# Patient Record
Sex: FEMALE | Race: WHITE | NOT HISPANIC OR LATINO
[De-identification: names, ages, dates, MRNs, and addresses within clinical notes are randomized per-mention and may not be internally consistent; named-entity substitution may affect disease eponyms.]

---

## 2018-01-17 ENCOUNTER — RECORDS - HEALTHEAST (OUTPATIENT)
Dept: ADMINISTRATIVE | Facility: OTHER | Age: 68
End: 2018-01-17

## 2018-01-18 ENCOUNTER — RECORDS - HEALTHEAST (OUTPATIENT)
Dept: ADMINISTRATIVE | Facility: OTHER | Age: 68
End: 2018-01-18

## 2018-01-30 ENCOUNTER — AMBULATORY - HEALTHEAST (OUTPATIENT)
Dept: CARDIAC REHAB | Facility: CLINIC | Age: 68
End: 2018-01-30

## 2018-01-30 DIAGNOSIS — I21.4 NON-ST ELEVATION MI (NSTEMI) (H): ICD-10-CM

## 2018-01-30 DIAGNOSIS — Z95.5 STENTED CORONARY ARTERY: ICD-10-CM

## 2018-02-09 ENCOUNTER — AMBULATORY - HEALTHEAST (OUTPATIENT)
Dept: CARDIAC REHAB | Facility: CLINIC | Age: 68
End: 2018-02-09

## 2018-02-09 DIAGNOSIS — I21.4 NON-ST ELEVATION MI (NSTEMI) (H): ICD-10-CM

## 2018-02-09 DIAGNOSIS — Z95.5 STENTED CORONARY ARTERY: ICD-10-CM

## 2018-02-09 RX ORDER — LISINOPRIL 5 MG/1
5 TABLET ORAL DAILY
Status: SHIPPED | COMMUNITY
Start: 2018-02-09

## 2018-02-09 RX ORDER — BISOPROLOL FUMARATE 10 MG/1
10 TABLET, FILM COATED ORAL DAILY
Status: SHIPPED | COMMUNITY
Start: 2018-02-09

## 2018-02-09 RX ORDER — EZETIMIBE 10 MG/1
10 TABLET ORAL DAILY
Status: SHIPPED | COMMUNITY
Start: 2018-02-09

## 2018-02-09 RX ORDER — ACETAMINOPHEN 500 MG
500 TABLET ORAL EVERY 6 HOURS PRN
Status: SHIPPED | COMMUNITY
Start: 2018-02-09

## 2018-02-09 RX ORDER — CLOPIDOGREL BISULFATE 75 MG/1
75 TABLET ORAL DAILY
Status: SHIPPED | COMMUNITY
Start: 2018-02-09

## 2018-02-09 RX ORDER — ASPIRIN 81 MG/1
81 TABLET, CHEWABLE ORAL DAILY
Status: SHIPPED | COMMUNITY
Start: 2018-02-09

## 2018-02-09 RX ORDER — ROSUVASTATIN CALCIUM 40 MG/1
40 TABLET, COATED ORAL AT BEDTIME
Status: SHIPPED | COMMUNITY
Start: 2018-02-09

## 2018-02-09 RX ORDER — CHLORTHALIDONE 25 MG/1
25 TABLET ORAL DAILY
Status: SHIPPED | COMMUNITY
Start: 2018-02-09

## 2018-02-22 ENCOUNTER — AMBULATORY - HEALTHEAST (OUTPATIENT)
Dept: CARDIAC REHAB | Facility: CLINIC | Age: 68
End: 2018-02-22

## 2018-02-22 DIAGNOSIS — Z95.5 STENTED CORONARY ARTERY: ICD-10-CM

## 2018-02-22 DIAGNOSIS — I21.4 NON-ST ELEVATION MI (NSTEMI) (H): ICD-10-CM

## 2018-03-01 ENCOUNTER — AMBULATORY - HEALTHEAST (OUTPATIENT)
Dept: CARDIAC REHAB | Facility: CLINIC | Age: 68
End: 2018-03-01

## 2018-03-01 DIAGNOSIS — Z95.5 STENTED CORONARY ARTERY: ICD-10-CM

## 2018-03-01 DIAGNOSIS — I21.4 NON-ST ELEVATION MI (NSTEMI) (H): ICD-10-CM

## 2018-03-08 ENCOUNTER — AMBULATORY - HEALTHEAST (OUTPATIENT)
Dept: CARDIAC REHAB | Facility: CLINIC | Age: 68
End: 2018-03-08

## 2018-03-08 DIAGNOSIS — Z95.5 STENTED CORONARY ARTERY: ICD-10-CM

## 2018-03-08 DIAGNOSIS — I21.4 NON-ST ELEVATION MI (NSTEMI) (H): ICD-10-CM

## 2018-03-15 ENCOUNTER — AMBULATORY - HEALTHEAST (OUTPATIENT)
Dept: CARDIAC REHAB | Facility: CLINIC | Age: 68
End: 2018-03-15

## 2018-03-15 DIAGNOSIS — I21.4 NON-ST ELEVATION MI (NSTEMI) (H): ICD-10-CM

## 2018-03-15 DIAGNOSIS — Z95.5 STENTED CORONARY ARTERY: ICD-10-CM

## 2018-03-22 ENCOUNTER — AMBULATORY - HEALTHEAST (OUTPATIENT)
Dept: CARDIAC REHAB | Facility: CLINIC | Age: 68
End: 2018-03-22

## 2018-03-22 DIAGNOSIS — I21.4 NON-ST ELEVATION MI (NSTEMI) (H): ICD-10-CM

## 2018-03-22 DIAGNOSIS — Z95.5 STENTED CORONARY ARTERY: ICD-10-CM

## 2018-03-29 ENCOUNTER — AMBULATORY - HEALTHEAST (OUTPATIENT)
Dept: CARDIAC REHAB | Facility: CLINIC | Age: 68
End: 2018-03-29

## 2018-03-29 DIAGNOSIS — I21.4 NON-ST ELEVATION MI (NSTEMI) (H): ICD-10-CM

## 2018-03-29 DIAGNOSIS — Z95.5 STENTED CORONARY ARTERY: ICD-10-CM

## 2018-04-05 ENCOUNTER — AMBULATORY - HEALTHEAST (OUTPATIENT)
Dept: CARDIAC REHAB | Facility: CLINIC | Age: 68
End: 2018-04-05

## 2018-04-05 DIAGNOSIS — Z95.5 STENTED CORONARY ARTERY: ICD-10-CM

## 2018-04-05 DIAGNOSIS — I21.4 NON-ST ELEVATION MI (NSTEMI) (H): ICD-10-CM

## 2018-04-12 ENCOUNTER — AMBULATORY - HEALTHEAST (OUTPATIENT)
Dept: CARDIAC REHAB | Facility: CLINIC | Age: 68
End: 2018-04-12

## 2018-04-12 DIAGNOSIS — I21.4 NON-ST ELEVATION MI (NSTEMI) (H): ICD-10-CM

## 2018-04-12 DIAGNOSIS — Z95.5 STENTED CORONARY ARTERY: ICD-10-CM

## 2018-05-10 ENCOUNTER — AMBULATORY - HEALTHEAST (OUTPATIENT)
Dept: CARDIAC REHAB | Facility: CLINIC | Age: 68
End: 2018-05-10

## 2018-05-10 DIAGNOSIS — Z95.5 STENTED CORONARY ARTERY: ICD-10-CM

## 2018-05-10 DIAGNOSIS — I21.4 NON-ST ELEVATION MI (NSTEMI) (H): ICD-10-CM

## 2018-05-17 ENCOUNTER — AMBULATORY - HEALTHEAST (OUTPATIENT)
Dept: CARDIAC REHAB | Facility: CLINIC | Age: 68
End: 2018-05-17

## 2018-05-17 DIAGNOSIS — Z95.5 STENTED CORONARY ARTERY: ICD-10-CM

## 2018-05-17 DIAGNOSIS — I21.4 NON-ST ELEVATION MI (NSTEMI) (H): ICD-10-CM

## 2018-05-24 ENCOUNTER — AMBULATORY - HEALTHEAST (OUTPATIENT)
Dept: CARDIAC REHAB | Facility: CLINIC | Age: 68
End: 2018-05-24

## 2018-05-24 DIAGNOSIS — Z95.5 STENTED CORONARY ARTERY: ICD-10-CM

## 2018-05-24 DIAGNOSIS — I21.4 NON-ST ELEVATION MI (NSTEMI) (H): ICD-10-CM

## 2021-06-01 VITALS — WEIGHT: 193.4 LBS

## 2021-06-01 VITALS — WEIGHT: 190.6 LBS

## 2021-06-01 VITALS — WEIGHT: 200 LBS

## 2021-06-01 VITALS — WEIGHT: 186 LBS

## 2021-06-01 VITALS — WEIGHT: 194.2 LBS

## 2021-06-01 VITALS — WEIGHT: 192 LBS

## 2021-06-01 VITALS — WEIGHT: 190.8 LBS

## 2021-06-01 VITALS — WEIGHT: 191.8 LBS

## 2021-06-01 VITALS — WEIGHT: 183.4 LBS

## 2021-06-01 VITALS — WEIGHT: 186.8 LBS

## 2021-06-01 VITALS — WEIGHT: 196 LBS

## 2021-06-15 NOTE — PROGRESS NOTES
Cardiac Rehab  Phase II Assessment    Assessment Date: 2/9/2018    Diagnosis/Procedure:: NSTEMI, PCI with NAREN to LAD, dRCA  Date of Onset: 1/17/18, 1/18/18    ICD/Pacemaker: No Parameters: N/A  Post-op Complications: None  ECG History: SR/SB EF%:55  Past Medical History:     HTN    Dyslipidemia    Family Hx of CAD    Stress    Overweight    Pre Diabetes    Malignent Melonoma    Migraines    appy    Tubal ligation     Physical Assessment  Precautions/ Physical Limitations: standard CAD  Oxygen: No  O2 Sats: 100 Lung Sounds: clear Edema: none  Incisions: healed well  Sleeping Pattern: good   Appetite: good   Nutrition Risk Screen: Weight loss: pt does not know ideal weight and would like more information from dietician    Pain  Location: -  Characteristics:-  Intensity: (0-10 scale) 0  Current Pain Management: -  Intervention: -  Response: -    Psychosocial/ Emotional Health  1. In the past 12 months, have you been in a relationship where you have been abused physically, emotionally, sexually or financially? No  notified: NA  2. Who do you turn to for emotional support?: , family, friends  3. Do you have cultural or spiritual needs? No  4. Have there been any major life changes in the past 12 months? No    Referral Information  Primary Physician: Marixa Rankin MD  Cardiologist: Wm. Hoffman [facility]  Surgeon: -    Home exercise/Equipment: none    Patient's long-term goal(s): wt loss, heart healthy lifestyle    1. Living Accommodations: Home Steps: Yes      Support people at home:    2. Marital Status:   3. Family is able to assist with cares      Caodaism/Community involvement: yes  4. Recreation/Hobbies: reading, movies and baking and music

## 2021-06-15 NOTE — PROGRESS NOTES
ITP ASSESSMENT   Assessment Day: Initial  SOC:2/9/2018  Session Number: 1  Precautions: CAD  Diagnosis: MI;Stent (1/17/18 NSTEMI, 1/18/18 PCI with NAREN  to LAD, dRCA)  Risk Stratification: High (Due to high LDL)  Referring Provider: Wm Hoffman-Cardiologist at [facility] *ITP's  EXERCISE  Exercise Assessment: Initial     6 Minute Walk Test   Pre   Pre Exercise HR: 50 (100%)                  Pre Exercise BP: 112/64    Peak  Peak HR: 96                 Peak BP: 128/58  Peak feet: 1550  Peak O2 SAT: 98  Peak RPE: 11  Peak MPH: 2.94    Symptoms:  Peak Symptoms: none    5 mins. Post  5 Min Post HR: 53 (100%)  5 Min Post BP: 110/60                         Exercise Plan  Goals Next 30 days  ADL'S: Independent with all ADL's  Leisure: Start a regular exercise program. 15'x2/day 4-6x/week including CR attendence of 1x/week  Work: RTW without limitations or restrictions.     Education Goals: Patient can state cardiac s/s and appropriate emergency response.  Education Goals Met: Has system for taking medication.;Medication review.    Exercise Prescription  Exercise Mode: Bike;Treadmill;Nustep;Arm Erg.;Stairs;Hallway Walking  Frequency: 1x/week  Duration: 30-45minutes  Intensity / THR: 20-30 beats above resting heart rate  RPE 11-14  Progression / Met level: 3.2-5+  Resistive Training?: Yes    Current Exercise (mins/week): 1    Interventions  Home Exercise:  Mode: Walking  Frequency: 4-6x/week  Duration: 15' x2 goal is 30minutes per day but starting in short increments    Education Material : Educational videos;Provide written material;Individual education and counseling;Offer educational classes    Education Completed  Exercise Education Completed: Cardiac Anatomy;Signs and Symptoms;Medication review;RPE;Emergency Plan;Home Exercise;Warm up/cool down;FITT Principles;BP/HR Reponse to exercise;Stretching;Strength training;Benefits of Exercise;End point of exercise            Exercise Follow-up/Discharge  Follow up/Discharge: Pt is  "only able to attend CR 1x/week due to full time work. Pt plans to exercise on non rehab days. Continue to encourage HEP on non rehab days and progress as lisa each session in CR.  NUTRITION  Nutrition Assessment: Initial    Nutrition Risk Factors:  Nutrition Risk Factors: Dyslipidemia;Overweight  Cholesterol: 290 (1/18/18)  LDL: 205  HDL: 49  Triglycerides: 182    Nutrition Plan  Interventions  Diet Consult: NA (will schedule in future when pt interested)  Other Nutrition Intervention: Diet Class;Therapist/Pt Discussion;Provide with Written Material  Initial Rate Your Plate Score: 0 (RYP survey given today and encourage to complete by next x)    Education Completed  Nutrition Education Completed: Low Saturated fat diet;Risk factor overview;Low sodium diet    Goals  Nutrition Goals (Next 30 days): Patient will lose weight;Patient knows appropriate portion size;Patient can identify their risk factors for CAD    Goals Met  Nutrition Goals Met: Provided Rate your Plate Survey;Completed Nutritional Risk Screen;Patient states following a low saturated fat diet;Patient follows a low sodium diet    Height, Weight, and  BMI  Weight: 200 lb (90.7 kg)  Height: 5' 9\" (1.753 m)  BMI: 29.52    Nutrition Follow-up  Follow-up/Discharge: Pt only attends 1x/week so we will try and see if a dietician is available for 1:1 consult on Thursdays. Pt is already following a heart healthy diet.        Other Risk Factors  Other Risk Factor Assessment: Initial    HTN Risk Factor: Hypertension    Pre Exercise BP: 112/64  Post Exercise BP: 110/60    Hypertension Plan  Goals  HTN Goals: Exercises regularly    Goals Met  HTN Goals Met: Follow low sodium diet;Take medication as prescribed    HTN Interventions  HTN Interventions: Diet consult;Therapist/patient discussion;Provide written material;Offer educational classes    HTN Education Completed  HTN Education Completed: Low sodium diet;Medication review;Risk factor overview    Tobacco Risk Factor: " NA    Risk Factor Follow-up   Follow-up/Discharge: Family Hx is huge CRF.    PSYCHOSOCIAL  Psychosocial Assessment: Initial     [facility] L Summary Score: 20    PARISH-D Score: 4    Psychosocial Risk Factor: Stress    Psychosocial Plan  Interventions  If PARISH-D > 15 send letter to MD  Interventions: Offer educational videos and classes;Provide written material;Individual education and counseling    Education Completed  Education Completed: Relaxation/Coping Techniques;S/S of depression;Effects of stress on body    Goals  Goals (Next 30 days): [facility];Patient demonstrates understanding of stress, no goals identified for the next 30 days    Goals Met  Goals Met: Identified Support system;Oriented to stress management classes;Identify stressors;Practicing stress management skills    Psychosocial Follow-up  Follow-up/Discharge: Pt has a great support system with family, friends and co-workers. She feels she is managing her stressors pretty well through: support system, reading, movies, meditation, deep breathing, prayer, shan and massage. He stressors that are hard to mange is her boss/upper management due to different personality. She plans to consult with an HR friend and occupational RN for guidedance with dealing with different management styles. Otherwise pt reports loving her job, her team which is mostly always postitive with some challenges.    Patient involved in Goal setting?: Yes    Signature: _____________________________________________________________    Date: __________________Time: __________________

## 2021-06-16 NOTE — PROGRESS NOTES
ITP ASSESSMENT   Assessment Day: 30 Day  Session Number: 4  Precautions: CAD  Diagnosis: MI;Stent (1/17/18 NSTEMI, 1/18/18 PCI with NAREN  to LAD, dRCA)  Risk Stratification: High  Referring Provider: Jesus Hoffman MD  EXERCISE  Exercise Assessment: Reassessment                          Exercise Plan  Goals Next 30 days  ADL'S: Independent with all home activities-goal met  Leisure: Tolerate climbing 4 flights of stairs in the parking ramp  Work: Pt has returned to work with no difficulties-goal met    Education Goals: All goals in this section met  Education Goals Met: Patient can state cardiac s/s and appropriate emergency response.;Has system for taking medication.;Medication review.                        Goals Met  Initial ADL's goals met: Independent with all ADLS  Initial Leisure goals met: Pt is walking 4x/week on non-rehab days for 30 days  Intial Work goals met: Pt has RTW  Initial Progression: Pt is consistent with home exercise, has reached 3.1MET level on treadmill.    Exercise Prescription  Exercise Mode: Treadmill;Bike;Nustep;Arm Erg.;Stairs  Frequency: 1x/week  Duration: 40-45 min  Intensity / THR: 20-30 beats above resting heart rate  RPE 11-14  Progression / Met level: 3.2-5  Resistive Training?: Yes    Current Exercise (mins/week): 160    Interventions  Home Exercise:  Mode: walking with stairs  Frequency: 4x/week  Duration: 30-40 min    Education Material : Educational videos;Provide written material;Individual education and counseling;Offer educational classes    Education Completed  Exercise Education Completed: Cardiac Anatomy;Signs and Symptoms;Medication review;RPE;Emergency Plan;Home Exercise;Warm up/cool down;FITT Principles;BP/HR Reponse to exercise;Stretching;Strength training;Benefits of Exercise;End point of exercise            Exercise Follow-up/Discharge  Follow up/Discharge: Pt has started exercising consistently 4 days per week on non-rehab days         NUTRITION  Nutrition  "Assessment: Reassessment    Nutrition Risk Factors:  Nutrition Risk Factors: Dyslipidemia;Overweight    Nutrition Plan  Interventions  Diet Consult: Declined  Other Nutrition Intervention: Diet Class;Therapist/Pt Discussion;Provide with Written Material  Initial Rate Your Plate Score: 0 (RYP survey given today and encourage to complete by next session)      Education Completed  Nutrition Education Completed: Low Saturated fat diet;Risk factor overview;Low sodium diet    Goals  Nutrition Goals (Next 30 days): Patient will lose weight (pt wants to continue losing wt)    Goals Met  Nutrition Goals Met: Provided Rate your Plate Survey;Completed Nutritional Risk Screen;Patient states following a low saturated fat diet;Patient follows a low sodium diet;Patient has lost weight;Patient can identify their risk factors for CAD;Patient knows appropriate portion size    Height, Weight, and  BMI  Weight: 193 lb 6.4 oz (87.7 kg)  Height: 5' 9\" (1.753 m)  BMI: 28.55    Nutrition Follow-up  Follow-up/Discharge: Pt is pleased with her wt loss so far.     Other Risk Factors  Other Risk Factor Assessment: Reassessment    HTN Risk Factor: Hypertension    Pre Exercise BP: 118/64  Post Exercise BP: 116/56    Hypertension Plan  Goals  HTN Goals: Patient demonstrates understanding of HTN, no goals identified for the next 30 days    Goals Met  HTN Goals Met: Follow low sodium diet;Take medication as prescribed;Exercises regularly    HTN Interventions  HTN Interventions: Therapist/patient discussion;Provide written material;Offer educational classes    HTN Education Completed  HTN Education Completed: Low sodium diet;Medication review;Risk factor overview    Tobacco Risk Factor: NA    Risk Factor Follow-up   Follow-up/Discharge: Family Hx is huge CRF.        PSYCHOSOCIAL  Psychosocial Assessment: Reassessment     Psychosocial Risk Factor: Stress    Psychosocial Plan  Interventions  Interventions: Offer educational videos and classes;Provide " written material;Individual education and counseling    Education Completed  Education Completed: Relaxation/Coping Techniques;S/S of depression;Effects of stress on body    Goals  Goals (Next 30 days): Improvement in [employer] COOP score;Patient demonstrates understanding of stress, no goals identified for the next 30 days    Goals Met  Goals Met: Identified Support system;Oriented to stress management classes;Identify stressors;Practicing stress management skills    Psychosocial Follow-up  Follow-up/Discharge: Pt has a great support system with family, friends and co-workers. She feels she is managing her stressors pretty well through: support system, baking, reading, movies, meditation, deep breathing, prayer, shan and massage. He stressors that are hard to mange is her boss/upper management due to different personality. She plans to consult with an HR friend and occupational RN for guidedance with dealing with different management styles. Otherwise pt reports loving her job, her team which is mostly always postitive with some challenges.          Patient involved in Goal setting?: Yes    Signature: _____________________________________________________________    Date: __________________    Time: __________________

## 2021-06-17 NOTE — PROGRESS NOTES
Tracy WYATT Sterlingmichel has participated in 10 sessions of Phase II Cardiac Rehab.    Progress Report:   Cardiac Rehab Treatment Progress Report 3/22/2018 3/29/2018 4/5/2018 4/12/2018 5/10/2018   Weight 191 lbs 13 oz 190 lbs 13 oz 190 lbs 10 oz 190 lbs 10 oz 186 lbs 13 oz   Pre Exercise  HR 52 60 60 59 52   Pre Exercise /60 102/62 122/62 126/66 96/54   Treadmill Peak HR 85 86 - 86 91   Treadmill Peak Blood Pressure 118/60 - - 136/62 122/62   Nustep Peak Heart Rate - - 76 - 70   Heart Rate 59 60 54 59 55   Post Exercise /60 106/60 98/52 102/60 104/50   ECG SB/SR w/rare PVC's  SB/SR w/rare PVC's  SB/SR SB/SR SB/SR, isolated PVC   Total Exercise Minutes 49 47 46 50 43         Current Status:  Currently exercising without complaints or symptoms.  Pt reports she is tolerating all normal home and work activities.  Pt plans to attend 2 more sessions.    If Physician recommends change in treatment plan, please place orders.        __________________________________________________      _____________  Signature                                                                                                  Date

## 2021-06-17 NOTE — PROGRESS NOTES
ITP ASSESSMENT   Assessment Day: 60 Day   SOC:2/9/18  Session Number: 8  Precautions: Standard CAD Precautions  Diagnosis: MI;Stent (1/17/18 NSTEMI, 1/18/18 PCI with NAREN  to LAD, dRCA)  Risk Stratification: High  Referring Provider: Wm Dalton   EXERCISE  Exercise Assessment: Reassessment                       Exercise Plan  Goals Next 30 days  ADL'S: Independent-Goal met  Leisure: Resume Strength training program 2x/weekContinue to improve endurance with stair climbing from 2 flights to 4 flights of stairs  Work: RTW without limitations    Education Goals: All goals in this section met  Education Goals Met: Patient can state cardiac s/s and appropriate emergency response.;Has system for taking medication.;Medication review.                        Goals Met  30 day ADL'S goals met: Independent with all ADL's and home activities. Goal is met  30 day Leisure goals met: Pt is able to walk 2 flights of stairs and reports that she continues to get tired. Her goal is to be able ot due 4 flights of stairs.  30 day Work goals met: RTW without difficulty.  30 Day Progression: Pt has progressed to 2.7-2.9 Vision bike, 2.4 Nustep, 3.6 TM and 5 Stairs. Continue to progress to LTG of 5-6METs. Pt is also interested in strength training and will start 2x/week. 1x in CR and 1x at [facility]    Initial ADL's goals met: Independent with all ADLS  Initial Leisure goals met: Pt is walking 4x/week on non-rehab days for 30 days  Intial Work goals met: Pt has RTW  Initial Progression: Pt is consistent with home exercise, has reached 3.1MET level on treadmill.    Exercise Prescription  Exercise Mode: Treadmill;Bike;Nustep;Arm Erg.;Stairs;Hallway Walking  Frequency: 1x/week  Duration: 40-50minutes  Intensity / THR: 20-30 beats above resting heart rate  RPE 11-14  Progression / Met level: 3.6-6  Resistive Training?: Yes    Current Exercise (mins/week): 150    Interventions  Home Exercise:  Mode: Walking  Frequency: 3-5x/week  Duration:  "30minutes    Education Material : Educational videos;Provide written material;Individual education and counseling;Offer educational classes    Education Completed  Exercise Education Completed: Cardiac Anatomy;Signs and Symptoms;Medication review;RPE;Emergency Plan;Home Exercise;Warm up/cool down;FITT Principles;BP/HR Reponse to exercise;Stretching;Strength training;Benefits of Exercise;End point of exercise            Exercise Follow-up/Discharge  Follow up/Discharge: Pt would like to continue to progress to LTG of 5-6METs and complete 12 sessions. She has a [employer] membership and will be walking outside for [employer]. NUTRITION  Nutrition Assessment: Reassessment  Nutrition Risk Factors:  Nutrition Risk Factors: Dyslipidemia;Overweight  Nutrition Plan  Interventions  Diet Consult: Declined  Other Nutrition Intervention: Diet Class;Therapist/Pt Discussion;Provide with Written Material  Initial Rate Your Plate Score: 0 (RYP survey given today and encourage to complete by next x)  Follow-Up Rate Your Plate Score: 61    Education Completed  Nutrition Education Completed: Low Saturated fat diet;Risk factor overview;Low sodium diet    Goals  Nutrition Goals (Next 30 days): Patient will lose weight (Continue to lose weight-pt goal)    Goals Met  Nutrition Goals Met: Provided Rate your Plate Survey;Completed Nutritional Risk Screen;Patient states following a low saturated fat diet;Patient follows a low sodium diet;Patient has lost weight;Patient can identify their risk factors for CAD;Patient knows appropriate portion size;Reviewed Dietitian schedule    Height, Weight, and  BMI  Weight: 190 lb 9.6 oz (86.5 kg)  Height: 5' 9\" (1.753 m)  BMI: 28.13    Nutrition Follow-up  Follow-up/Discharge: Pt continues to have great weight loss.       Other Risk Factors  Other Risk Factor Assessment: Reassessment    HTN Risk Factor: Hypertension    Pre Exercise BP: 122/62  Post Exercise BP: 98/52    Hypertension Plan  Goals  HTN Goals: Patient " demonstrates understanding of HTN, no goals identified for the next 30 days    Goals Met  HTN Goals Met: Follow low sodium diet;Take medication as prescribed;Exercises regularly    HTN Interventions  HTN Interventions: Therapist/patient discussion;Provide written material;Offer educational classes;Diet consult    HTN Education Completed  HTN Education Completed: Low sodium diet;Medication review;Risk factor overview    Tobacco Risk Factor: NA    Risk Factor Follow-up   Follow-up/Discharge: CRF: Pt is follow low fat, low salt and watching calories. Exercising regularly and taking medications asa prescribed PSYCHOSOCIAL  Psychosocial Assessment: Reassessment     Psychosocial Risk Factor: Stress    Psychosocial Plan  Interventions  Interventions: Offer educational videos and classes;Provide written material;Individual education and counseling    Education Completed  Education Completed: Relaxation/Coping Techniques;S/S of depression;Effects of stress on body    Goals  Goals (Next 30 days): Improvement in [employer] COOP score;Patient demonstrates understanding of stress, no goals identified for the next 30 days    Goals Met  Goals Met: Identified Support system;Oriented to stress management classes;Identify stressors;Practicing stress management skills    Psychosocial Follow-up  Follow-up/Discharge: Pt is managing stressors well. Reading and walking. Pt relies on her 10' walks at work to help relieve stress.    Patient involved in Goal setting?: Yes    Signature: _____________________________________________________________    Date: __________________Time: __________________

## 2021-06-17 NOTE — PROGRESS NOTES
"ITP ASSESSMENT   Assessment Day: 90 Day  Session Number: 10  Diagnosis: MI;Stent (1/17/18 NSTEMI, 1/18/18 PCI with NAREN  to LAD, dRCA)  Risk Stratification: High  Referring Provider: Jesus Hoffman MD  EXERCISE  Exercise Assessment: Reassessment                          Exercise Plan  Goals Next 30 days  ADL'S: Independent-Goal Met  Leisure:  Tolerate climbing 4 flights of stairs more comfortably  Work: Has RTW-goal met    Education Goals: All goals in this section met  Education Goals Met: Patient can state cardiac s/s and appropriate emergency response.;Has system for taking medication.;Medication review.                        Goals Met  60 day ADL'S goals met: Goal met  60 day Leisure goals met: Pt is doing strength training 2x/week, has done 4 flights of stair 3x, \"a little difficult, but I did it\"  60 day Work goals met: Has RTW without difficulty  60 Day Progression: Pt tolerated 5.2MET level on  stairs.    30 day ADL'S goals met: Independent with all ADL's and home activities. Goal is met  30 day Leisure goals met: Pt is able to walk 2 flights of stairs and reports that she continues to get tired. Her goal is to be able ot due 4 flights of stairs.  30 day Work goals met: RTW without difficulty.  30 Day Progression: Pt has progressed to 2.7-2.9 Vision bike, 2.4 Nustep, 3.6 TM and 5 Stairs. Continue to progress to LTG of 5-6METs. Pt is also interested in strength training and will start 2x/week. 1x in [place] and 1x at [facility]    Initial ADL's goals met: Independent with all ADLS  Initial Leisure goals met: Pt is walking 4x/week on non-rehab days for 30 days  Intial Work goals met: Pt has RTW  Initial Progression: Pt is consistent with home exercise, has reached 3.1MET level on treadmill.    Exercise Prescription  Exercise Mode: Treadmill;Bike;Nustep;Arm Erg.;Stairs;Hallway Walking  Frequency: 1x/week  Duration: 40-50 minutes  Intensity / THR: 20-30 beats above resting heart rate  RPE 11-14  Progression / Met level: " "3.6-6  Resistive Training?: Yes    Current Exercise (mins/week): 200    Interventions  Home Exercise:  Mode: walking, nustep, wts  Frequency: 5x/week  Duration: 45 min    Education Material : Educational videos;Provide written material;Individual education and counseling;Offer educational classes    Education Completed  Exercise Education Completed: Cardiac Anatomy;Signs and Symptoms;Medication review;RPE;Emergency Plan;Home Exercise;Warm up/cool down;FITT Principles;BP/HR Reponse to exercise;Stretching;Strength training;Benefits of Exercise;End point of exercise            Exercise Follow-up/Discharge  Follow up/Discharge: Pt has been walking outside 5x/week and going to [facility] 2-3x/week for nustep and wts NUTRITION  Nutrition Assessment: Reassessment    Nutrition Risk Factors:  Nutrition Risk Factors: Dyslipidemia;Overweight    Nutrition Plan  Interventions  Diet Consult: Declined  Other Nutrition Intervention: Diet Class;Therapist/Pt Discussion;Provide with Written Material  Initial Rate Your Plate Score: 0 (RYP survey given today and encourage to complete by next x)  Follow-Up Rate Your Plate Score: 61    Education Completed  Nutrition Education Completed: Low Saturated fat diet;Risk factor overview;Low sodium diet    Goals  Nutrition Goals (Next 30 days): Patient will lose weight    Goals Met  Nutrition Goals Met: Patient can identify their risk factors for CAD;Patient follows a low sodium diet;Patient states following a low saturated fat diet;Patient knows appropriate portion size;Patient has lost weight    Height, Weight, and  BMI  Weight: 186 lb 12.8 oz (84.7 kg)  Height: 5' 9\" (1.753 m)  BMI: 27.57    Nutrition Follow-up  Follow-up/Discharge: Pt continues to have great weight loss.       Other Risk Factors  Other Risk Factor Assessment: Reassessment    HTN Risk Factor: Hypertension    Pre Exercise BP: 96/54  Post Exercise BP: 102/60    Hypertension Plan  Goals  HTN Goals: Patient demonstrates understanding of " HTN, no goals identified for the next 30 days    Goals Met  HTN Goals Met: Follow low sodium diet;Take medication as prescribed;Exercises regularly    HTN Interventions  HTN Interventions: Therapist/patient discussion;Provide written material;Offer educational classes;Diet consult    HTN Education Completed  HTN Education Completed: Low sodium diet;Medication review;Risk factor overview    Tobacco Risk Factor: NA      Risk Factor Follow-up   Follow-up/Discharge: CRF: Pt is follow low fat, low salt and watching calories. Exercising regularly and taking medications asa prescribed   PSYCHOSOCIAL  Psychosocial Assessment: Reassessment     Psychosocial Risk Factor: Stress    Psychosocial Plan  Interventions  Interventions: Offer educational videos and classes;Provide written material;Individual education and counseling    Education Completed  Education Completed: Relaxation/Coping Techniques;S/S of depression;Effects of stress on body    Goals  Goals (Next 30 days): Improvement in [employer] COOP score    Goals Met  Goals Met: Identify stressors;Practicing stress management skills    Psychosocial Follow-up  Follow-up/Discharge: Pt reports stress at work is quite intense at times.  Pt identifies good coping skills and is using her relaxation skills well           Patient involved in Goal setting?: Yes, Pt pleased with progress so far.  Plans to attend 2 more sessions of cardiac rehab.    Signature: _____________________________________________________________    Date: __________________    Time: __________________

## 2021-06-18 NOTE — PROGRESS NOTES
ITP ASSESSMENT   Assessment Day: 120 Day (Last Day)  Session Number: 12  Precautions: Standard CAD precautions  Diagnosis: MI;Stent (1/17/18 NSTEMI, 1/18/18 PCI with NAREN  to LAD, dRCA)  Risk Stratification: High  Referring Provider: Marixa Rankin MD   ITP: Dr. Hoffman  EXERCISE  Exercise Assessment: Discharge     6 Minute Walk Test   Pre   Pre Exercise HR: 55                  Pre Exercise BP: 102/70    Peak  Peak HR: 96                 Peak BP: 130/60  Peak feet: 1660  Peak O2 SAT: 98  Peak RPE: 11  Peak MPH: 3.14    Symptoms:  Peak Symptoms: denies cv sx/sx     5 mins. Post  5 Min Post HR: 58  5 Min Post BP: 102/60                         Exercise Plan    Education Goals: All goals in this section met  Education Goals Met: Patient can state cardiac s/s and appropriate emergency response.;Has system for taking medication.;Medication review.                        Goals Met  90 day ADL'S goals met: Pt is independent in all ADL's without complications.   90 day Leisure goals met: Pt has not resumed climbing the 4 flights of stairs b/c they are being worked on at work but pt continues to keep this as a goal. Pt is walking a flight of stairs up and down throughout her work day about 6-10 days per day without ay complications. Pt has done the stairs in [facility] rehab at a 5.7 MET level.   90 day Work goals met: Pt has returned to work full time without complications.   90 Day Progress: PT has progressed to a max MET level of 5.7 MET and wishes to be graduated at this time. Pt reached a 5.7 MET level on the stairs for 5 minutes. Pt has reached a 4.1 MET on the TM for 15 min and 3.3 MET on the bikes for 25-30 minutes. Pt has utilized weights for upper body strength. Pt feels she is back to activities without complications. Pt wishes to be graduated and will continue with her HEP on her own.     60 day ADL'S goals met: Goal met  60 day Leisure goals met: Pt is doing strength training 2x/week, has done 4 flights of stair  "3x, \"a little difficult, but I did it\"  60 day Work goals met: Has RTW without difficulty  60 Day Progression: Pt tolerated 5.2MET level on  stairs.    30 day ADL'S goals met: Independent with all ADL's and home activities. Goal is met  30 day Leisure goals met: Pt is able to walk 2 flights of stairs and reports that she continues to get tired. Her goal is to be able ot due 4 flights of stairs.  30 day Work goals met: RTW without difficulty.  30 Day Progression: Pt has progressed to 2.7-2.9 Vision bike, 2.4 Nustep, 3.6 TM and 5 Stairs. Continue to progress to LTG of 5-6METs. Pt is also interested in strength training and will start 2x/week. 1x in [place] and 1x at [facility]    Initial ADL's goals met: Independent with all ADLS  Initial Leisure goals met: Pt is walking 4x/week on non-rehab days for 30 days  Intial Work goals met: Pt has RTW  Initial Progression: Pt is consistent with home exercise, has reached 3.1MET level on treadmill.    Exercise Prescription  Intensity / THR:  ([place] 113-128bpm)    Current Exercise (mins/week): 250    Interventions  Home Exercise:  Mode: Walking, Nustep and TM and weights ([facility])  Frequency: 4-6x/week  Duration: 30-45 minutes    Education Material : Educational videos;Provide written material;Individual education and counseling;Offer educational classes    Education Completed  Exercise Education Completed: Cardiac Anatomy;Signs and Symptoms;Medication review;RPE;Emergency Plan;Home Exercise;Warm up/cool down;FITT Principles;BP/HR Reponse to exercise;Stretching;Benefits of Exercise;Strength training;End point of exercise            Exercise Follow-up/Discharge  Follow up/Discharge: Pt has been exercising regularly and feels confident to continue with HEP. PT wishes to graduate at this time and feels she is back to all activities on her own and is very pleased with her progress in cardiac rehab. Pt feels emotionally and physically better.  NUTRITION  Nutrition Assessment: " "Discharge    Nutrition Risk Factors:  Nutrition Risk Factors: Dyslipidemia;Overweight    Nutrition Plan  Interventions  Diet Consult: Declined  Other Nutrition Intervention: Diet Class;Therapist/Pt Discussion;Provide with Written Material  No Data Recorded  Follow-Up Rate Your Plate Score: 61    Education Completed  Nutrition Education Completed: Low Saturated fat diet;Risk factor overview;Low sodium diet;Weight management      Goals Met  Nutrition Goals Met: Patient can identify their risk factors for CAD;Patient follows a low sodium diet;Completed Nutritional Risk Screen;Provided Rate your Plate Survey;Reviewed Dietitian schedule;Patient states following a low saturated fat diet;Patient has lost weight;Patient knows appropriate portion size    Height, Weight, and  BMI  Weight: 183 lb 6.4 oz (83.2 kg)  Height: 5' 9\" (1.753 m)  BMI: 27.07    Nutrition Follow-up  Follow-up/Discharge: Pt continues to have great weight loss of about 18lbs since starting cardiac rehab. Pt weight goal is 180lb. Pt declines meeting with the deitician and is very pleased with her diet. Pt still does have treats in moderation and within reason. Pt's  does most of the cooking and they eat lean meats and chicken and fish. Pt is eating more fresh vegetables and fruits and does choose healthy options when eating out. Pt is working on staying hydrated with water. Encouraged pt to have waterbottle with her at all times as a reminder to get in the hydration. Pt does drink caffeine in coffee daily. Pt has good understanding of heart health.        Other Risk Factors  Other Risk Factor Assessment: Discharge    HTN Risk Factor: Hypertension    Pre Exercise BP: 102/70  Post Exercise BP: 102/60    Hypertension Plan    Goals Met  HTN Goals Met: Follow low sodium diet;Take medication as prescribed;Exercises regularly    HTN Interventions  HTN Interventions: Provide written material;Therapist/patient discussion;Offer educational videos;Offer " educational classes    HTN Education Completed  HTN Education Completed: Low sodium diet;Medication review;Risk factor overview    Tobacco Risk Factor: NA    Risk Factor Follow-up   Follow-up/Discharge: CRF: Reviewed CRF with patient.    PSYCHOSOCIAL  Psychosocial Assessment: Discharge     [facility] Score: 17    PARISH-D Score: 6    Psychosocial Risk Factor: Stress    Psychosocial Plan  Interventions  Interventions: Offer educational videos and classes;Provide written material;Individual education and counseling    Education Completed  Education Completed: Relaxation/Coping Techniques;S/S of depression;Effects of stress on body      Goals Met  Goals Met: Identified Support system;Oriented to stress management classes;Identify stressors;Improvement in [place] COOP score;Practicing stress management skills (20-17)    Psychosocial Follow-up  Follow-up/Discharge: Pt reports most of her stress comes from work. She does report that it varies from day to day. Pt has been seeing a  through her work and has been focusing on deep breathing and relaxation techniques. Pt does admit this has been working well for her. Pt does note improvement in herself both physically and emotinally since starting cardiac rehab. Pt reports her exercsie also helps manage her stress. Pt PARISH-D did remain mostly the same and pt does admit because the stress changes day by day. Pt does report she manages her stress much better than she did when she started.            Patient involved in Goal setting?: Yes   Pt reached a 5.7 MET level and wishes to graduate at this time.     Signature: _____________________________________________________________    Date: __________________    Time: __________________

## 2021-06-18 NOTE — PROGRESS NOTES
[facility] Home Exercise Program/Discharge Summary    You have reached a 5.7 MET level and have completed 12 sessions of Cardiac Rehab.     Exercise Goals:   4-6x/week for aerobic exercise 30-60 minutes and 2-3x/week for strength training.   Modality Duration Intensity/  Rate of Perceived Exertion (RPE: 11-14)   Warm-up 5 minutes 8-10   Walk 25-35 minutes 11-14   Bike 25-35 minutes  Nustep: L5 75-85 11-14   Treadmill 25-35 minutes  3mph/2% elevation 11-14   Cool Down 5 minutes 8-10   Strength Training *every other day 10-15 minutes 12-14   Stretching 5 minutes 8-10   Heart Rate Guidelines:   20-30bpm> RHR (Resting Heart Rate) or Kar[place] (60 % to 75%: 113-128 bpm)  Special Recommendations:    Continue to follow low fat, low salt, heart healthy diet.    Continue to follow up with your doctors/providers as recommended (e.g cholesterol).    A well rounded exercise program will included aerobic/cardiovascular exercise (e.g like walking, biking, or swimming ), strength training (e.g. free weights, exercise bands, or weight machines) and stretching program.   Stop Exercise!!! If any of the following occur:    Angina/chest pain    Dizziness    Excessive perspiration/cold sweats    Abnormal shortness of breath    Changes in heart rate (slow, fast, irregular)    Sudden fatigue or numbness    Nausea  Also...    Avoid extreme temperatures - exercise indoors if necessary:   Temp+ Humidity >160, Temp-Wind Chill <20    Wait at least 1 hour after a meal before strenuous activity    Do not exercise if you have a fever or are ill    Wear comfortable, supportive athletic clothing and shoes.  You are now on your way to a heart healthy lifestyle on your own. You can do it!